# Patient Record
Sex: FEMALE
[De-identification: names, ages, dates, MRNs, and addresses within clinical notes are randomized per-mention and may not be internally consistent; named-entity substitution may affect disease eponyms.]

---

## 2021-06-06 ENCOUNTER — RX ONLY (OUTPATIENT)
Age: 41
Setting detail: RX ONLY
End: 2021-06-06

## 2024-06-07 ENCOUNTER — APPOINTMENT (RX ONLY)
Dept: URBAN - METROPOLITAN AREA CLINIC 46 | Facility: CLINIC | Age: 44
Setting detail: DERMATOLOGY
End: 2024-06-07

## 2024-06-07 DIAGNOSIS — L23.89 ALLERGIC CONTACT DERMATITIS DUE TO OTHER AGENTS: ICD-10-CM | Status: INADEQUATELY CONTROLLED

## 2024-06-07 PROCEDURE — ? COUNSELING

## 2024-06-07 PROCEDURE — ? PRESCRIPTION

## 2024-06-07 PROCEDURE — ? ADDITIONAL NOTES

## 2024-06-07 PROCEDURE — ? KOH PREP

## 2024-06-07 PROCEDURE — 99203 OFFICE O/P NEW LOW 30 MIN: CPT

## 2024-06-07 PROCEDURE — ? PRESCRIPTION MEDICATION MANAGEMENT

## 2024-06-07 RX ORDER — TRIAMCINOLONE ACETONIDE 1 MG/G
THIN LAYER CREAM TOPICAL
Qty: 453.6 | Refills: 3 | Status: ERX | COMMUNITY
Start: 2024-06-07

## 2024-06-07 RX ORDER — HYDROXYZINE HYDROCHLORIDE 25 MG/1
ONE TABLET TABLET, FILM COATED ORAL QHS
Qty: 30 | Refills: 0 | Status: ERX | COMMUNITY
Start: 2024-06-07

## 2024-06-07 RX ADMIN — TRIAMCINOLONE ACETONIDE THIN LAYER: 1 CREAM TOPICAL at 00:00

## 2024-06-07 RX ADMIN — HYDROXYZINE HYDROCHLORIDE ONE TABLET: 25 TABLET, FILM COATED ORAL at 00:00

## 2024-06-07 ASSESSMENT — LOCATION SIMPLE DESCRIPTION DERM
LOCATION SIMPLE: RIGHT FOOT
LOCATION SIMPLE: LEFT FOOT
LOCATION SIMPLE: LEFT HAND
LOCATION SIMPLE: RIGHT HAND
LOCATION SIMPLE: RIGHT UPPER ARM

## 2024-06-07 ASSESSMENT — LOCATION ZONE DERM
LOCATION ZONE: HAND
LOCATION ZONE: ARM
LOCATION ZONE: FEET

## 2024-06-07 ASSESSMENT — LOCATION DETAILED DESCRIPTION DERM
LOCATION DETAILED: RIGHT ANTERIOR DISTAL UPPER ARM
LOCATION DETAILED: LEFT DORSAL FOOT
LOCATION DETAILED: LEFT DORSAL MIDDLE FINGER METACARPOPHALANGEAL JOINT
LOCATION DETAILED: RIGHT DORSAL FOOT
LOCATION DETAILED: RIGHT DORSAL MIDDLE FINGER METACARPOPHALANGEAL JOINT

## 2024-06-07 ASSESSMENT — BSA RASH: BSA RASH: 2

## 2024-06-07 ASSESSMENT — SEVERITY ASSESSMENT 2020: SEVERITY 2020: MODERATE

## 2024-06-07 NOTE — PROCEDURE: ADDITIONAL NOTES
Additional Notes: 3 week h/o linear, pruritic, vesicular eruption of feet, arms, and hands.  No h/o the same.  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  H/o dermatographism x few years.  No problem with allergies when she lived in [place]; moved to [place] 9 years ago.\\n\\nSocH:   not itching.\\n\\nKOH (-) scabies.  (Did see pine pollen, incidentally, unsurprisingly.)\\n\\nStart TAC cream BID.\\nStart HYDROXYZINE 1 pill QHS prn itch; caution drowsiness.\\n\\n(If she has dermatographism, then she could trial CLARITIN 1 pill every AM and see if that diminishes her symptoms.)\\n\\nExpectation is clearance of presumed ACD by 2-4 weeks.  She will contact me if not.\\n\\nRTC prn.
Render Risk Assessment In Note?: no
Detail Level: Simple

## 2024-06-07 NOTE — HPI: RASH
What Type Of Note Output Would You Prefer (Optional)?: Bullet Format
Is The Patient Presenting As Previously Scheduled?: Yes
How Severe Is Your Rash?: moderate
Is This A New Presentation, Or A Follow-Up?: Rash
Additional History: Pt here for rash on L arm, hand, and toes and R hand. Pt c/o itching and flaky. Pt says it’s been present for some weeks. Pt has treated with Zyrtec and anti itch cream.

## 2024-06-07 NOTE — PROCEDURE: PRESCRIPTION MEDICATION MANAGEMENT
Detail Level: Zone
Plan: RTC PRN
Render In Strict Bullet Format?: No
Initiate Treatment: triamcinolone acetonide 0.1 % topical cream As instructed\\nQuantity: 453.6 g  Days Supply: 30\\nSig: Apply a thin layer twice daily arms, hands and feet for x 3 weeks\\n\\nhydroxyzine HCl 25 mg tablet QHS\\nQuantity: 30.0 Tablet\\nSig: Take on tablet by mouth once nightly as needed.